# Patient Record
Sex: FEMALE | Race: WHITE | NOT HISPANIC OR LATINO | Employment: OTHER | URBAN - METROPOLITAN AREA
[De-identification: names, ages, dates, MRNs, and addresses within clinical notes are randomized per-mention and may not be internally consistent; named-entity substitution may affect disease eponyms.]

---

## 2021-03-03 DIAGNOSIS — Z23 NEED FOR VACCINATION: ICD-10-CM

## 2023-01-11 ENCOUNTER — TELEPHONE (OUTPATIENT)
Dept: HEALTH INFORMATION MANAGEMENT | Facility: OTHER | Age: 68
End: 2023-01-11
Payer: MEDICARE

## 2023-03-02 ENCOUNTER — HOSPITAL ENCOUNTER (OUTPATIENT)
Dept: LAB | Facility: MEDICAL CENTER | Age: 68
End: 2023-03-02
Attending: FAMILY MEDICINE
Payer: MEDICARE

## 2023-03-02 LAB
T4 FREE SERPL-MCNC: 1.33 NG/DL (ref 0.93–1.7)
TSH SERPL DL<=0.005 MIU/L-ACNC: 0.34 UIU/ML (ref 0.38–5.33)

## 2023-03-02 PROCEDURE — 36415 COLL VENOUS BLD VENIPUNCTURE: CPT

## 2023-03-02 PROCEDURE — 84443 ASSAY THYROID STIM HORMONE: CPT

## 2023-03-02 PROCEDURE — 84439 ASSAY OF FREE THYROXINE: CPT

## 2023-03-06 ENCOUNTER — HOSPITAL ENCOUNTER (OUTPATIENT)
Dept: LAB | Facility: MEDICAL CENTER | Age: 68
End: 2023-03-06
Attending: FAMILY MEDICINE
Payer: MEDICARE

## 2023-03-06 LAB
ALBUMIN SERPL BCP-MCNC: 4.5 G/DL (ref 3.2–4.9)
ALBUMIN/GLOB SERPL: 1.5 G/DL
ALP SERPL-CCNC: 86 U/L (ref 30–99)
ALT SERPL-CCNC: 20 U/L (ref 2–50)
ANION GAP SERPL CALC-SCNC: 12 MMOL/L (ref 7–16)
AST SERPL-CCNC: 26 U/L (ref 12–45)
BILIRUB SERPL-MCNC: 0.6 MG/DL (ref 0.1–1.5)
BUN SERPL-MCNC: 21 MG/DL (ref 8–22)
CALCIUM ALBUM COR SERPL-MCNC: 9.5 MG/DL (ref 8.5–10.5)
CALCIUM SERPL-MCNC: 9.9 MG/DL (ref 8.5–10.5)
CHLORIDE SERPL-SCNC: 102 MMOL/L (ref 96–112)
CHOLEST SERPL-MCNC: 264 MG/DL (ref 100–199)
CO2 SERPL-SCNC: 24 MMOL/L (ref 20–33)
CREAT SERPL-MCNC: 1.09 MG/DL (ref 0.5–1.4)
ERYTHROCYTE [DISTWIDTH] IN BLOOD BY AUTOMATED COUNT: 46.2 FL (ref 35.9–50)
FASTING STATUS PATIENT QL REPORTED: NORMAL
GFR SERPLBLD CREATININE-BSD FMLA CKD-EPI: 55 ML/MIN/1.73 M 2
GLOBULIN SER CALC-MCNC: 3 G/DL (ref 1.9–3.5)
GLUCOSE SERPL-MCNC: 91 MG/DL (ref 65–99)
HCT VFR BLD AUTO: 46.7 % (ref 37–47)
HDLC SERPL-MCNC: 64 MG/DL
HGB BLD-MCNC: 15.3 G/DL (ref 12–16)
LDLC SERPL CALC-MCNC: 174 MG/DL
MAGNESIUM SERPL-MCNC: 2 MG/DL (ref 1.5–2.5)
MCH RBC QN AUTO: 32.1 PG (ref 27–33)
MCHC RBC AUTO-ENTMCNC: 32.8 G/DL (ref 33.6–35)
MCV RBC AUTO: 98.1 FL (ref 81.4–97.8)
PLATELET # BLD AUTO: 252 K/UL (ref 164–446)
PMV BLD AUTO: 9.5 FL (ref 9–12.9)
POTASSIUM SERPL-SCNC: 5.3 MMOL/L (ref 3.6–5.5)
PROT SERPL-MCNC: 7.5 G/DL (ref 6–8.2)
RBC # BLD AUTO: 4.76 M/UL (ref 4.2–5.4)
SODIUM SERPL-SCNC: 138 MMOL/L (ref 135–145)
TRIGL SERPL-MCNC: 131 MG/DL (ref 0–149)
WBC # BLD AUTO: 6.1 K/UL (ref 4.8–10.8)

## 2023-03-06 PROCEDURE — 36415 COLL VENOUS BLD VENIPUNCTURE: CPT

## 2023-03-06 PROCEDURE — 80061 LIPID PANEL: CPT

## 2023-03-06 PROCEDURE — 83735 ASSAY OF MAGNESIUM: CPT

## 2023-03-06 PROCEDURE — 80053 COMPREHEN METABOLIC PANEL: CPT

## 2023-03-06 PROCEDURE — 85027 COMPLETE CBC AUTOMATED: CPT

## 2023-04-05 ENCOUNTER — HOSPITAL ENCOUNTER (OUTPATIENT)
Dept: LAB | Facility: MEDICAL CENTER | Age: 68
End: 2023-04-05
Attending: ORTHOPAEDIC SURGERY
Payer: MEDICARE

## 2023-04-05 LAB
BASOPHILS # BLD AUTO: 0.5 % (ref 0–1.8)
BASOPHILS # BLD: 0.04 K/UL (ref 0–0.12)
EOSINOPHIL # BLD AUTO: 0.06 K/UL (ref 0–0.51)
EOSINOPHIL NFR BLD: 0.8 % (ref 0–6.9)
ERYTHROCYTE [DISTWIDTH] IN BLOOD BY AUTOMATED COUNT: 44.8 FL (ref 35.9–50)
ERYTHROCYTE [SEDIMENTATION RATE] IN BLOOD BY WESTERGREN METHOD: 4 MM/HOUR (ref 0–25)
HCT VFR BLD AUTO: 49.4 % (ref 37–47)
HGB BLD-MCNC: 15.6 G/DL (ref 12–16)
IMM GRANULOCYTES # BLD AUTO: 0.02 K/UL (ref 0–0.11)
IMM GRANULOCYTES NFR BLD AUTO: 0.3 % (ref 0–0.9)
LYMPHOCYTES # BLD AUTO: 1.81 K/UL (ref 1–4.8)
LYMPHOCYTES NFR BLD: 23.3 % (ref 22–41)
MCH RBC QN AUTO: 31.2 PG (ref 27–33)
MCHC RBC AUTO-ENTMCNC: 31.6 G/DL (ref 33.6–35)
MCV RBC AUTO: 98.8 FL (ref 81.4–97.8)
MONOCYTES # BLD AUTO: 0.51 K/UL (ref 0–0.85)
MONOCYTES NFR BLD AUTO: 6.6 % (ref 0–13.4)
NEUTROPHILS # BLD AUTO: 5.32 K/UL (ref 2–7.15)
NEUTROPHILS NFR BLD: 68.5 % (ref 44–72)
NRBC # BLD AUTO: 0 K/UL
NRBC BLD-RTO: 0 /100 WBC
PLATELET # BLD AUTO: 286 K/UL (ref 164–446)
PMV BLD AUTO: 9.8 FL (ref 9–12.9)
RBC # BLD AUTO: 5 M/UL (ref 4.2–5.4)
WBC # BLD AUTO: 7.8 K/UL (ref 4.8–10.8)

## 2023-04-05 PROCEDURE — 86038 ANTINUCLEAR ANTIBODIES: CPT

## 2023-04-05 PROCEDURE — 86200 CCP ANTIBODY: CPT

## 2023-04-05 PROCEDURE — 85025 COMPLETE CBC W/AUTO DIFF WBC: CPT

## 2023-04-05 PROCEDURE — 85652 RBC SED RATE AUTOMATED: CPT

## 2023-04-05 PROCEDURE — 86431 RHEUMATOID FACTOR QUANT: CPT

## 2023-04-05 PROCEDURE — 36415 COLL VENOUS BLD VENIPUNCTURE: CPT

## 2023-04-05 PROCEDURE — 86812 HLA TYPING A B OR C: CPT

## 2023-04-05 PROCEDURE — 86140 C-REACTIVE PROTEIN: CPT

## 2023-04-05 PROCEDURE — 84550 ASSAY OF BLOOD/URIC ACID: CPT

## 2023-04-06 LAB
CRP SERPL HS-MCNC: <0.3 MG/DL (ref 0–0.75)
RHEUMATOID FACT SER IA-ACNC: 10 IU/ML (ref 0–14)
URATE SERPL-MCNC: 9.6 MG/DL (ref 1.9–8.2)

## 2023-04-07 LAB — HLA-B27 QL FC: NEGATIVE

## 2023-04-08 LAB
CCP IGG SERPL-ACNC: 7 UNITS (ref 0–19)
NUCLEAR IGG SER QL IA: NORMAL

## 2023-04-25 ENCOUNTER — OFFICE VISIT (OUTPATIENT)
Dept: CARDIOLOGY | Facility: MEDICAL CENTER | Age: 68
End: 2023-04-25
Payer: MEDICARE

## 2023-04-25 VITALS
BODY MASS INDEX: 29.03 KG/M2 | HEART RATE: 94 BPM | OXYGEN SATURATION: 98 % | SYSTOLIC BLOOD PRESSURE: 120 MMHG | DIASTOLIC BLOOD PRESSURE: 80 MMHG | HEIGHT: 67 IN | WEIGHT: 185 LBS | RESPIRATION RATE: 16 BRPM

## 2023-04-25 DIAGNOSIS — D68.69 HYPERCOAGULABLE STATE DUE TO PERSISTENT ATRIAL FIBRILLATION (HCC): ICD-10-CM

## 2023-04-25 DIAGNOSIS — I10 PRIMARY HYPERTENSION: ICD-10-CM

## 2023-04-25 DIAGNOSIS — I48.19 HYPERCOAGULABLE STATE DUE TO PERSISTENT ATRIAL FIBRILLATION (HCC): ICD-10-CM

## 2023-04-25 DIAGNOSIS — I48.91 ATRIAL FIBRILLATION, UNSPECIFIED TYPE (HCC): ICD-10-CM

## 2023-04-25 LAB — EKG IMPRESSION: NORMAL

## 2023-04-25 PROCEDURE — 99204 OFFICE O/P NEW MOD 45 MIN: CPT | Performed by: INTERNAL MEDICINE

## 2023-04-25 PROCEDURE — 93000 ELECTROCARDIOGRAM COMPLETE: CPT | Performed by: INTERNAL MEDICINE

## 2023-04-25 RX ORDER — LISINOPRIL AND HYDROCHLOROTHIAZIDE 20; 12.5 MG/1; MG/1
1 TABLET ORAL DAILY
COMMUNITY
Start: 2023-03-09

## 2023-04-25 RX ORDER — BISOPROLOL FUMARATE 5 MG/1
5 TABLET, FILM COATED ORAL DAILY
COMMUNITY
Start: 2023-04-04 | End: 2023-04-25

## 2023-04-25 RX ORDER — APIXABAN 5 MG/1
5 TABLET, FILM COATED ORAL 2 TIMES DAILY
COMMUNITY
Start: 2023-04-05

## 2023-04-25 RX ORDER — PRAMIPEXOLE DIHYDROCHLORIDE 0.12 MG/1
0.25 TABLET ORAL NIGHTLY PRN
COMMUNITY
Start: 2023-03-31

## 2023-04-25 RX ORDER — BISOPROLOL FUMARATE 10 MG/1
10 TABLET, FILM COATED ORAL DAILY
Qty: 100 TABLET | Refills: 4
Start: 2023-04-25 | End: 2023-05-04 | Stop reason: SDUPTHER

## 2023-04-25 ASSESSMENT — ENCOUNTER SYMPTOMS
HEMATOCHEZIA: 0
COUGH: 0
WHEEZING: 0
DIAPHORESIS: 0
FEVER: 0
HEMOPTYSIS: 0

## 2023-04-25 ASSESSMENT — FIBROSIS 4 INDEX: FIB4 SCORE: 1.42

## 2023-04-25 NOTE — Clinical Note
Nathaniel Duke, Can you schedule the cardioversion at least 3 weeks from today so that we will have had 3 weeks of missed doses of Eliquis.  Thank you CH

## 2023-04-25 NOTE — PROGRESS NOTES
Cardiology Initial Consultation Note    Date of note: 4/25/2023    Primary Care Provider: Sindy Del Rosario D.O.  Referring Provider: Sindy Del Rosario D.O.    Patient Name: Ela Meza  YOB: 1955  MRN:              [id number]    Chief Complaint   Patient presents with    Atrial Fibrillation     History of Present Illness: Ms. Ela Meza is a 68 y.o. female whose current medical problems include hypertension, excessive alcohol use, insomnia who is here for cardiac consultation for newly noted atrial fibrillation.    She recently went to see her primary care provider for multiple complaints.  Difficulty sleeping, drinking more, foot pain.  During this visit, atrial fibrillation was incidentally diagnosed.  Patient reports she has no symptoms from this.  On the visit though she did mention she is feeling more fatigued.  She normally does a lot of walking as she walks her dogs.  She has not really noticed any significant symptoms from that.  During this visit, she was started on bisoprolol 5 mg p.o. daily and Eliquis 5 mg p.o. twice daily.  She admits she has missed a few doses.  She also has cut back on her alcohol and is only having 1 drink and she measures it out every day.  She drinks 1 cup of coffee a day.     Blood tests drawn 3/6/23:  WBC 6.1, hemoglobin 15.3, platelets 252,000, sodium 138, potassium 5.3, glucose 91, creatinine 1.05, normal LFTs    Cardiovascular Risk Factors:  1. Smoking status:   Tobacco Use: Low Risk     Smoking Tobacco Use: Never    Smokeless Tobacco Use: Never    Passive Exposure: Not on file     2. Type II Diabetes Mellitus:   Lab Results   Component Value Date/Time    HBA1C 5.3 02/10/2020 10:52 AM     3. Hypertension: Yes on lisinopril-HCTZ 20-12.5 mg po qd  4. Dyslipidemia: No   Cholesterol,Tot   Date Value Ref Range Status   03/06/2023 264 (H) 100 - 199 mg/dL Final     LDL   Date Value Ref Range Status   03/06/2023 174 (H) <100 mg/dL Final     HDL   Date  "Value Ref Range Status   03/06/2023 64 >=40 mg/dL Final     Triglycerides   Date Value Ref Range Status   03/06/2023 131 0 - 149 mg/dL Final     5. Family history of early Coronary Artery Disease in a first degree relative (Male less than 55 years of age; Female less than 65 years of age): no    History reviewed. No pertinent past medical history.    History reviewed. No pertinent surgical history.    Current Outpatient Medications   Medication Sig Dispense Refill    pramipexole (MIRAPEX) 0.125 MG Tab Take 0.25 mg by mouth every evening.      lisinopril-hydrochlorothiazide (PRINZIDE) 20-12.5 MG per tablet Take 1 Tablet by mouth every day.      ELIQUIS 5 MG Tab Take 5 mg by mouth 2 times a day.      bisoprolol (ZEBETA) 10 MG tablet Take 1 Tablet by mouth every day. 100 Tablet 4     No current facility-administered medications for this visit.       Allergies   Allergen Reactions    Compazine        History reviewed. No pertinent family history.    Social History     Socioeconomic History    Marital status:    Tobacco Use    Smoking status: Never    Smokeless tobacco: Never   Substance and Sexual Activity    Alcohol use: Yes     Alcohol/week: 4.2 oz     Types: 7 Standard drinks or equivalent per week    Drug use: Never        Review of Systems   Constitutional: Negative for diaphoresis and fever.   Respiratory:  Negative for cough, hemoptysis and wheezing.    Gastrointestinal:  Negative for hematochezia and melena.   Genitourinary:  Negative for hematuria.     Physical Exam:  Ambulatory Vitals  /80   Pulse 94   Resp 16   Ht 1.702 m (5' 7\")   Wt 83.9 kg (185 lb)   SpO2 98%    Oxygen Therapy:  Pulse Oximetry: 98 %  BP Readings from Last 4 Encounters:   04/25/23 120/80   03/01/12 138/88       Weight/BMI:   Body mass index is 28.98 kg/m².  Wt Readings from Last 2 Encounters:   04/25/23 83.9 kg (185 lb)   03/01/12 91.6 kg (202 lb)     Physical Exam  Constitutional:       Appearance: Normal appearance. "   Eyes:      Extraocular Movements: Extraocular movements intact.      Conjunctiva/sclera: Conjunctivae normal.   Neck:      Vascular: No carotid bruit or JVD.   Cardiovascular:      Rate and Rhythm: Normal rate. Rhythm irregular.      Pulses:           Radial pulses are 2+ on the right side and 2+ on the left side.        Posterior tibial pulses are 1+ on the right side and 1+ on the left side.      Heart sounds: Normal heart sounds. No murmur heard.    No friction rub. No gallop.   Pulmonary:      Effort: Pulmonary effort is normal. No respiratory distress.      Breath sounds: Normal breath sounds. No wheezing.   Abdominal:      General: Bowel sounds are normal.      Palpations: Abdomen is soft.   Musculoskeletal:      Cervical back: Neck supple.      Right lower leg: No edema.      Left lower leg: No edema.   Skin:     General: Skin is warm and dry.   Neurological:      Mental Status: She is alert and oriented to person, place, and time. Mental status is at baseline.   Psychiatric:         Mood and Affect: Mood normal.        Lab Data Review:  Lab Results   Component Value Date/Time    SODIUM 138 03/06/2023 01:07 PM    POTASSIUM 5.3 03/06/2023 01:07 PM    CHLORIDE 102 03/06/2023 01:07 PM    CO2 24 03/06/2023 01:07 PM    GLUCOSE 91 03/06/2023 01:07 PM    BUN 21 03/06/2023 01:07 PM    CREATININE 1.09 03/06/2023 01:07 PM     Lab Results   Component Value Date/Time    ALKPHOSPHAT 86 03/06/2023 01:07 PM    ASTSGOT 26 03/06/2023 01:07 PM    ALTSGPT 20 03/06/2023 01:07 PM    TBILIRUBIN 0.6 03/06/2023 01:07 PM      Lab Results   Component Value Date/Time    WBC 7.8 04/05/2023 03:44 PM     Lab Results   Component Value Date/Time    TSHULTRASEN 0.340 (L) 03/02/2023 12:06 PM   Free T4 1.33    Cardiac Imaging and Procedures Review:    EKG dated 3/6/23: My personal interpretation is atrial fibrillation, 114 bpm, poor R wave progression, consider lead placement.  ECG again repeated today 4/25/23: Atrial fibrillation, 102 bpm,  low voltages in the precordial leads.  Abnormal R wave progression likely lead placement    Assessment & Plan     1.  Atrial fibrillation of unknown duration.  Not quite clear if she is truly symptomatic or not.  She does recall having an ECG done 3 years ago and was not told she had atrial fibrillation.  Therefore presumptively I would assume A-fib occurred between then and now also about 3 years.  Discussed with her treatments of atrial fibrillation which includes rate control versus rhythm control.  As this is first time this has been captured, discussed it would not be unreasonable to attempt cardioversion to achieve sinus rhythm.  Went over risks of electrical cardioversion which includes respiratory depression from deep sedation, operation pneumonia, skin burns, arrhythmias, less than 1% risk of stroke if she does not miss any doses of the Eliquis, may not be successful.  She would like to proceed.  - Schedule cardioversion, but at least 3 weeks from today so that she will not miss any doses of the Eliquis  - Increase bisoprolol to 10 mg p.o. daily for better rate control  -Check an echo    [id number] Score of 3.  Risk of stroke is 3 to 4 %/year.  Based on current guidelines, would recommend direct thrombin inhibitor.  Patient is agreeable to taking Eliquis 5 mg p.o. twice daily (discussed at least 10 hours apart but no more than 12 hours apart).  Patient did ask about the watchman.  I discussed with her that it is available, we typically reserved for patients who cannot tolerate Eliquis.  However if she is interested, we can refer her to structural heart program.  She would like to hold off and ask again in 1 year.  - Continue Eliquis 5 mg p.o. twice daily    3.  Hypertension.  Controlled.  - Continue with lisinopril hydrochlorothiazide.  Patient is also on bisoprolol for rate control of atrial fibrillation.  This can also lower blood pressure.    Shared Medical Decision Making:  All of patient's  questions were answered to the best of my knowledge and to patient's satisfaction. It was a pleasure seeing Ms. Ela Meza in my clinic today. Return in about 2 months (around 6/25/2023). Patient is aware to call the cardiology clinic with any questions or concerns.    Marisol Simeon MD  [facility]  [address]  [place] [address]  Phone:  [phone]  Fax:  [phone]    Please note that this dictation was created using voice recognition software. I have made every reasonable attempt to correct obvious errors, but it is possible there are errors of grammar and possibly content that I did not discover before finalizing the note.

## 2023-04-26 ENCOUNTER — TELEPHONE (OUTPATIENT)
Dept: CARDIOLOGY | Facility: MEDICAL CENTER | Age: 68
End: 2023-04-26
Payer: MEDICARE

## 2023-04-26 NOTE — TELEPHONE ENCOUNTER
Patient is scheduled on 5-18-23 for a Cardioversion w/anesthesia with . No meds to stop and patient to check in at 8:00 for a 10:00 procedure. H&P was done on 4-25-23 by Dr. NERISSA Simeon. Pre admit to call patient.

## 2023-04-26 NOTE — TELEPHONE ENCOUNTER
----- Message from Marisol Simeon M.D. sent at 4/25/2023  4:35 PM PDT -----  Nathaniel Duke,  Can you schedule the cardioversion at least 3 weeks from today so that we will have had 3 weeks of missed doses of Eliquis.  Thank you CH

## 2023-04-27 ENCOUNTER — APPOINTMENT (OUTPATIENT)
Dept: ADMISSIONS | Facility: MEDICAL CENTER | Age: 68
End: 2023-04-27
Attending: INTERNAL MEDICINE
Payer: MEDICARE

## 2023-05-01 ENCOUNTER — TELEPHONE (OUTPATIENT)
Dept: CARDIOLOGY | Facility: MEDICAL CENTER | Age: 68
End: 2023-05-01
Payer: MEDICARE

## 2023-05-01 DIAGNOSIS — I48.91 ATRIAL FIBRILLATION, UNSPECIFIED TYPE (HCC): ICD-10-CM

## 2023-05-01 DIAGNOSIS — I10 PRIMARY HYPERTENSION: ICD-10-CM

## 2023-05-01 NOTE — TELEPHONE ENCOUNTER
Caller:  Delmar Mahmood    Medication Name and Dosage:    bisoprolol (ZEBETA) 10 MG tablet (Order #[id number]) on 4/25/23    Medication amount left: New    Preferred Pharmacy:   [facility] [address]    Other questions (Topic):  Ready to fill    Callback Number (Will only call for issues): [phone]    Thank you,   Susana MATTHEWS

## 2023-05-04 RX ORDER — BISOPROLOL FUMARATE 10 MG/1
10 TABLET, FILM COATED ORAL DAILY
Qty: 100 TABLET | Refills: 4 | Status: SHIPPED | OUTPATIENT
Start: 2023-05-04

## 2023-05-04 NOTE — TELEPHONE ENCOUNTER
"Reviewed chart, see that bisoprolol Rx was sent by  at last visit 4/25/23 to pt's [facility] pharmacy. Noted on Rx--\"don't fill yet\". I tried to call pt to let her know that it was sent and she can contact pharmacy to fill, no answer, left  msg informing her and I will go ahead and send again to ensure no issue on our end. Asked pt to call back if she has any issues.  "

## 2023-05-04 NOTE — TELEPHONE ENCOUNTER
Caller: Ela Meza     Topic/issue: Pt is extremely upset she does not have any of the bisoprolol (ZEBETA) 5 MG Tab [[id number]]      Pt states  this was requested on 05/01/2023 and has a long drive to the [facility] on [address]. She is stating this is unprofessional. Her dosage was changed and should have been called in on 04/25/2023. Please call once it was sent in .    Call back: [phone] (home)      Thank You    Edwina MCGARRY

## 2023-05-05 ENCOUNTER — PRE-ADMISSION TESTING (OUTPATIENT)
Dept: ADMISSIONS | Facility: MEDICAL CENTER | Age: 68
End: 2023-05-05
Attending: INTERNAL MEDICINE
Payer: MEDICARE

## 2023-05-05 VITALS — HEIGHT: 67 IN | WEIGHT: 185 LBS | BODY MASS INDEX: 29.03 KG/M2

## 2023-05-05 ASSESSMENT — FIBROSIS 4 INDEX: FIB4 SCORE: 1.42

## 2023-05-15 ENCOUNTER — PRE-ADMISSION TESTING (OUTPATIENT)
Dept: ADMISSIONS | Facility: MEDICAL CENTER | Age: 68
End: 2023-05-15
Attending: INTERNAL MEDICINE
Payer: MEDICARE

## 2023-05-15 DIAGNOSIS — Z01.812 PRE-OPERATIVE LABORATORY EXAMINATION: ICD-10-CM

## 2023-05-15 LAB
ANION GAP SERPL CALC-SCNC: 13 MMOL/L (ref 7–16)
BUN SERPL-MCNC: 28 MG/DL (ref 8–22)
CALCIUM SERPL-MCNC: 9 MG/DL (ref 8.5–10.5)
CHLORIDE SERPL-SCNC: 98 MMOL/L (ref 96–112)
CO2 SERPL-SCNC: 22 MMOL/L (ref 20–33)
CREAT SERPL-MCNC: 1.39 MG/DL (ref 0.5–1.4)
ERYTHROCYTE [DISTWIDTH] IN BLOOD BY AUTOMATED COUNT: 47.9 FL (ref 35.9–50)
GFR SERPLBLD CREATININE-BSD FMLA CKD-EPI: 41 ML/MIN/1.73 M 2
GLUCOSE SERPL-MCNC: 93 MG/DL (ref 65–99)
HCT VFR BLD AUTO: 43.8 % (ref 37–47)
HGB BLD-MCNC: 14.3 G/DL (ref 12–16)
INR PPP: 1.13 (ref 0.87–1.13)
MCH RBC QN AUTO: 31.4 PG (ref 27–33)
MCHC RBC AUTO-ENTMCNC: 32.6 G/DL (ref 33.6–35)
MCV RBC AUTO: 96.1 FL (ref 81.4–97.8)
PLATELET # BLD AUTO: 273 K/UL (ref 164–446)
PMV BLD AUTO: 9.2 FL (ref 9–12.9)
POTASSIUM SERPL-SCNC: 5 MMOL/L (ref 3.6–5.5)
PROTHROMBIN TIME: 14.3 SEC (ref 12–14.6)
RBC # BLD AUTO: 4.56 M/UL (ref 4.2–5.4)
SODIUM SERPL-SCNC: 133 MMOL/L (ref 135–145)
WBC # BLD AUTO: 4.8 K/UL (ref 4.8–10.8)

## 2023-05-15 PROCEDURE — 36415 COLL VENOUS BLD VENIPUNCTURE: CPT

## 2023-05-15 PROCEDURE — 80048 BASIC METABOLIC PNL TOTAL CA: CPT

## 2023-05-15 PROCEDURE — 85027 COMPLETE CBC AUTOMATED: CPT

## 2023-05-15 PROCEDURE — 85610 PROTHROMBIN TIME: CPT

## 2023-05-18 ENCOUNTER — HOSPITAL ENCOUNTER (OUTPATIENT)
Facility: MEDICAL CENTER | Age: 68
End: 2023-05-18
Attending: INTERNAL MEDICINE | Admitting: INTERNAL MEDICINE
Payer: MEDICARE

## 2023-05-18 ENCOUNTER — APPOINTMENT (OUTPATIENT)
Dept: CARDIOLOGY | Facility: MEDICAL CENTER | Age: 68
End: 2023-05-18
Attending: INTERNAL MEDICINE
Payer: MEDICARE

## 2023-05-18 ENCOUNTER — ANESTHESIA (OUTPATIENT)
Dept: CARDIOLOGY | Facility: MEDICAL CENTER | Age: 68
End: 2023-05-18
Payer: MEDICARE

## 2023-05-18 ENCOUNTER — ANESTHESIA EVENT (OUTPATIENT)
Dept: CARDIOLOGY | Facility: MEDICAL CENTER | Age: 68
End: 2023-05-18
Payer: MEDICARE

## 2023-05-18 VITALS
DIASTOLIC BLOOD PRESSURE: 69 MMHG | BODY MASS INDEX: 29.62 KG/M2 | RESPIRATION RATE: 16 BRPM | HEART RATE: 64 BPM | WEIGHT: 188.71 LBS | SYSTOLIC BLOOD PRESSURE: 129 MMHG | OXYGEN SATURATION: 98 % | HEIGHT: 67 IN | TEMPERATURE: 96.8 F

## 2023-05-18 DIAGNOSIS — I48.91 ATRIAL FIBRILLATION, UNSPECIFIED TYPE (HCC): ICD-10-CM

## 2023-05-18 LAB
EKG IMPRESSION: NORMAL
EKG IMPRESSION: NORMAL

## 2023-05-18 PROCEDURE — 700101 HCHG RX REV CODE 250: Performed by: ANESTHESIOLOGY

## 2023-05-18 PROCEDURE — 93010 ELECTROCARDIOGRAM REPORT: CPT | Performed by: INTERNAL MEDICINE

## 2023-05-18 PROCEDURE — 160002 HCHG RECOVERY MINUTES (STAT)

## 2023-05-18 PROCEDURE — 99100 ANES PT EXTEME AGE<1 YR&>70: CPT | Performed by: ANESTHESIOLOGY

## 2023-05-18 PROCEDURE — 700105 HCHG RX REV CODE 258: Performed by: INTERNAL MEDICINE

## 2023-05-18 PROCEDURE — 160035 HCHG PACU - 1ST 60 MINS PHASE I

## 2023-05-18 PROCEDURE — 700111 HCHG RX REV CODE 636 W/ 250 OVERRIDE (IP): Performed by: ANESTHESIOLOGY

## 2023-05-18 PROCEDURE — 00410 ANES INTEG SYS CONV ARRHYT: CPT | Performed by: ANESTHESIOLOGY

## 2023-05-18 PROCEDURE — 93005 ELECTROCARDIOGRAM TRACING: CPT | Performed by: INTERNAL MEDICINE

## 2023-05-18 PROCEDURE — 92960 CARDIOVERSION ELECTRIC EXT: CPT | Mod: 59 | Performed by: INTERNAL MEDICINE

## 2023-05-18 PROCEDURE — 92960 CARDIOVERSION ELECTRIC EXT: CPT

## 2023-05-18 RX ORDER — SODIUM CHLORIDE, SODIUM LACTATE, POTASSIUM CHLORIDE, CALCIUM CHLORIDE 600; 310; 30; 20 MG/100ML; MG/100ML; MG/100ML; MG/100ML
INJECTION, SOLUTION INTRAVENOUS CONTINUOUS
Status: DISCONTINUED | OUTPATIENT
Start: 2023-05-18 | End: 2023-05-18 | Stop reason: HOSPADM

## 2023-05-18 RX ORDER — ACETAMINOPHEN 500 MG
500-1000 TABLET ORAL EVERY 6 HOURS PRN
COMMUNITY

## 2023-05-18 RX ORDER — ALLOPURINOL 100 MG/1
100 TABLET ORAL DAILY
COMMUNITY
Start: 2023-05-12

## 2023-05-18 RX ORDER — HYDRALAZINE HYDROCHLORIDE 20 MG/ML
5 INJECTION INTRAMUSCULAR; INTRAVENOUS
Status: DISCONTINUED | OUTPATIENT
Start: 2023-05-18 | End: 2023-05-18 | Stop reason: HOSPADM

## 2023-05-18 RX ORDER — LIDOCAINE HYDROCHLORIDE 20 MG/ML
INJECTION, SOLUTION EPIDURAL; INFILTRATION; INTRACAUDAL; PERINEURAL PRN
Status: DISCONTINUED | OUTPATIENT
Start: 2023-05-18 | End: 2023-05-18 | Stop reason: SURG

## 2023-05-18 RX ORDER — ONDANSETRON 2 MG/ML
4 INJECTION INTRAMUSCULAR; INTRAVENOUS
Status: DISCONTINUED | OUTPATIENT
Start: 2023-05-18 | End: 2023-05-18 | Stop reason: HOSPADM

## 2023-05-18 RX ORDER — EPHEDRINE SULFATE 50 MG/ML
5 INJECTION, SOLUTION INTRAVENOUS
Status: DISCONTINUED | OUTPATIENT
Start: 2023-05-18 | End: 2023-05-18 | Stop reason: HOSPADM

## 2023-05-18 RX ORDER — HALOPERIDOL 5 MG/ML
1 INJECTION INTRAMUSCULAR
Status: DISCONTINUED | OUTPATIENT
Start: 2023-05-18 | End: 2023-05-18 | Stop reason: HOSPADM

## 2023-05-18 RX ORDER — DIPHENHYDRAMINE HYDROCHLORIDE 50 MG/ML
12.5 INJECTION INTRAMUSCULAR; INTRAVENOUS
Status: DISCONTINUED | OUTPATIENT
Start: 2023-05-18 | End: 2023-05-18 | Stop reason: HOSPADM

## 2023-05-18 RX ADMIN — PROPOFOL 20 MG: 10 INJECTION, EMULSION INTRAVENOUS at 10:05

## 2023-05-18 RX ADMIN — SODIUM CHLORIDE, POTASSIUM CHLORIDE, SODIUM LACTATE AND CALCIUM CHLORIDE: 600; 310; 30; 20 INJECTION, SOLUTION INTRAVENOUS at 09:58

## 2023-05-18 RX ADMIN — PROPOFOL 50 MG: 10 INJECTION, EMULSION INTRAVENOUS at 10:04

## 2023-05-18 RX ADMIN — LIDOCAINE HYDROCHLORIDE 100 MG: 20 INJECTION, SOLUTION EPIDURAL; INFILTRATION; INTRACAUDAL at 10:04

## 2023-05-18 ASSESSMENT — FIBROSIS 4 INDEX: FIB4 SCORE: 1.490711984999859798

## 2023-05-18 ASSESSMENT — PAIN SCALES - GENERAL: PAIN_LEVEL: 0

## 2023-05-18 NOTE — ANESTHESIA TIME REPORT
Anesthesia Start and Stop Event Times     Date Time Event    5/18/2023 0955 Ready for Procedure     0958 Anesthesia Start     1013 Anesthesia Stop        Responsible Staff  05/18/23    Name Role Begin End    Gil Kennedy M.D. Anesth 0958 1013        Overtime Reason:  no overtime (within assigned shift)    Comments:

## 2023-05-18 NOTE — ANESTHESIA POSTPROCEDURE EVALUATION
Patient: Ela Meza    Procedure Summary     Date: 05/18/23 Room / Location: [facility] ECHOCARDIOLOGY [facility]    Anesthesia Start: 0958 Anesthesia Stop: 1013    Procedure: CL-CARDIOVERSION Diagnosis:       Atrial fibrillation, unspecified type (HCC)      Unspecified atrial fibrillation    Scheduled Providers: Sebastian Diaz M.D.; Gil Kennedy M.D. Responsible Provider: Gil Kennedy M.D.    Anesthesia Type: MAC ASA Status: 2          Final Anesthesia Type: MAC  Last vitals  BP   Blood Pressure : 129/69    Temp   36 °C (96.8 °F)    Pulse   64   Resp   16    SpO2   98 %      Anesthesia Post Evaluation    Patient location during evaluation: PACU  Patient participation: complete - patient participated  Level of consciousness: awake and alert  Pain score: 0    Airway patency: patent  Anesthetic complications: no  Cardiovascular status: hemodynamically stable  Respiratory status: acceptable  Hydration status: euvolemic    PONV: none          No notable events documented.     Nurse Pain Score: 0 (NPRS)

## 2023-05-18 NOTE — DISCHARGE INSTRUCTIONS
If any questions arise, call your provider.  If your provider is not available, please feel free to call the [facility] at [phone].    MEDICATIONS: Resume taking daily medication.  Take prescribed pain medication with food.  If no medication is prescribed, you may take non-aspirin pain medication if needed.  PAIN MEDICATION CAN BE VERY CONSTIPATING.  Take a stool softener or laxative such as senokot, pericolace, or milk of magnesia if needed.    What to Expect Post Anesthesia    Rest and take it easy for the first 24 hours.  A responsible adult is recommended to remain with you during that time.  It is normal to feel sleepy.  We encourage you to not do anything that requires balance, judgment or coordination.    FOR 24 HOURS DO NOT:  Drive, operate machinery or run household appliances.  Drink beer or alcoholic beverages.  Make important decisions or sign legal documents.    To avoid nausea, slowly advance diet as tolerated, avoiding spicy or greasy foods for the first day.  Add more substantial food to your diet according to your provider's instructions.  Babies can be fed formula or breast milk as soon as they are hungry.  INCREASE FLUIDS AND FIBER TO AVOID CONSTIPATION.    MILD FLU-LIKE SYMPTOMS ARE NORMAL.  YOU MAY EXPERIENCE GENERALIZED MUSCLE ACHES, THROAT IRRITATION, HEADACHE AND/OR SOME NAUSEA.

## 2023-05-18 NOTE — ANESTHESIA PREPROCEDURE EVALUATION
Date/Time: 05/18/23 1000    Scheduled providers: Sebastian Diaz M.D.; Gil Kennedy M.D.    Procedure: CL-CARDIOVERSION    Diagnosis:       Atrial fibrillation, unspecified type (HCC) [I48.91]      Unspecified atrial fibrillation [I48.91]    Location: [facility] IMAGING - ECHOCARDIOLOGY [facility]          Relevant Problems   No relevant active problems       Physical Exam    Airway   Mallampati: II  TM distance: >3 FB  Neck ROM: full       Cardiovascular - normal exam  Rhythm: regular  Rate: normal  (-) murmur     Dental - normal exam           Pulmonary - normal exam  Breath sounds clear to auscultation     Abdominal    Neurological - normal exam                 Anesthesia Plan    ASA 2       Plan - MAC               Induction: intravenous      Pertinent diagnostic labs and testing reviewed    Informed Consent:    Anesthetic plan and risks discussed with patient.

## 2023-05-18 NOTE — PROCEDURES
Cardioversion Note    DATE: 5/18/2023    : Sang Saucedo MD    PROCEDURE PERFORMED:  Direct current cardioversion    INDICATION:  Atrial fibrillation    CONSENT:  The complete alternatives, risks, and benefits of the procedure were explained to the patient. Signed informed consent was obtained and placed in the chart prior to the procedure.    MEDICATIONS:  See Anesthesia record    ESTIMATED BLOOD LOSS: N/A    COMPLICATIONS: None    DESCRIPTION OF PROCEDURE:  The patient was brought to the procedure area in the fasting state.  Defibrillation pads were placed in the anterior/posterior position.  After achieving adequate sedation, one 200 J synchronized shock was delivered with successful conversion from atrial fibrillation to normal sinus rhythm.    IMPRESSION:  Successful direct current cardioverson

## 2023-05-18 NOTE — OR NURSING
Pt A&OX4. VSS. Pt on room air. Afebrile. Pt in sinus rhythm per pacu monitor and EKG post procedure. Respirations evne and unlabored, no shortness of breath. No pain or nausea. IV d/c'd. D/C instructions reviewed with pt. Assisted to dress. Standby assist with pt out to 's car.

## 2023-06-25 NOTE — PROGRESS NOTES
Cardiology Follow Up Note    Date of note: 6/24/2023    Primary Care Provider: Sindy Del Rosario D.O.    Patient Name: Ela Meza  YOB: 1955  MRN:              [id number]    Reason for Followup: Post cardioversion, echo is scheduled for 8/18/2023    History of Present Illness: Ms. Ela Meza is a 68 y.o. female whose current medical problems include hypertension, excessive alcohol use, insomnia, and atrial fibrillation who is here for cardiac for follow up.  Patient reports she is doing well.  She did have a skin burn after the cardioversion but it has resolved.  She does not report any difference in symptoms before after cardioversion.  She has never really felt any palpitations, no decrease in exercise tolerance, no fatigue.  She reports she is able to do all her activities without any problems.    She is interested in the Watchman device more because of the price of Eliquis.  She is still thinking about it.  Gave her a pamphlet.  She does not have any contraindication for direct thrombin inhibitors so I am not sure she would be a candidate.  However she would like we can always refer to structural heart program to see if she is a candidate.    Patient was seen 4/25/2003 for management of newly diagnosed atrial fibrillation.  Cardioversion was scheduled.  Bisoprolol increased to 10 mg p.o. daily.  Echo was ordered.  Eliquis 5 mg p.o. daily was continued.    The patient underwent electrical cardioversion on 5/18/2023 and successfully converted back to sinus rhythm.  Review of ECG dated 5/18/2023: Sinus rhythm, first-degree AV block, low voltages in the precordial leads, compared to prior ECG, sinus rhythm is now present    Cardiovascular Risk Factors:  1. Smoking status:   Tobacco Use: Low Risk  (6/27/2023)    Patient History     Smoking Tobacco Use: Never     Smokeless Tobacco Use: Never     Passive Exposure: Not on file     2. Type II Diabetes Mellitus:   Lab Results   Component  Value Date/Time    HBA1C 5.3 02/10/2020 10:52 AM     3. Hypertension: Yes on lisinopril hydrochlorothiazide 20-12.5 mg p.o. daily, bisoprolol 10 mg p.o. daily  4. Dyslipidemia: Not on statins  Cholesterol,Tot   Date Value Ref Range Status   03/06/2023 264 (H) 100 - 199 mg/dL Final     LDL   Date Value Ref Range Status   03/06/2023 174 (H) <100 mg/dL Final     HDL   Date Value Ref Range Status   03/06/2023 64 >=40 mg/dL Final     Triglycerides   Date Value Ref Range Status   03/06/2023 131 0 - 149 mg/dL Final     No problems updated.     History reviewed. No pertinent surgical history.     Current Outpatient Medications   Medication Sig Dispense Refill    bisoprolol (ZEBETA) 5 MG Tab Take 1 Tablet by mouth every day. 90 Tablet 4    allopurinol (ZYLOPRIM) 100 MG Tab Take 100 mg by mouth every day.      acetaminophen (TYLENOL) 500 MG Tab Take 500-1,000 mg by mouth every 6 hours as needed. Indications: Pain      Prenatal Vit-Fe Fumarate-FA (PRENATAL VITAMIN PO) Take 1 Tablet by mouth every day.      bisoprolol (ZEBETA) 10 MG tablet Take 1 Tablet by mouth every day. 100 Tablet 4    pramipexole (MIRAPEX) 0.125 MG Tab Take 0.25 mg by mouth at bedtime as needed. Indications: Restless Leg Syndrome      lisinopril-hydrochlorothiazide (PRINZIDE) 20-12.5 MG per tablet Take 1 Tablet by mouth every day.      ELIQUIS 5 MG Tab Take 5 mg by mouth 2 times a day.      colchicine (COLCRYS) 0.6 MG Tab Take 0.6 mg by mouth every day.       No current facility-administered medications for this visit.       Allergies   Allergen Reactions    Nsaids Unspecified     S/P Bayron n Y    Compazine Swelling     tongue     Review of Systems   Constitutional: Negative for diaphoresis and fever.   Cardiovascular:  Negative for chest pain, dyspnea on exertion, leg swelling, near-syncope, orthopnea, palpitations, paroxysmal nocturnal dyspnea and syncope.   Respiratory: Negative.  Negative for cough, hemoptysis and wheezing.    Gastrointestinal:  Negative  "for hematochezia and melena.   Genitourinary:  Negative for hematuria.       Physical Exam:  Ambulatory Vitals  /80 (BP Location: Left arm, Patient Position: Sitting, BP Cuff Size: Adult)   Pulse 96   Resp 16   Ht 1.702 m (5' 7\")   Wt 86.2 kg (190 lb)   SpO2 100%    Oxygen Therapy:  Pulse Oximetry: 100 %  BP Readings from Last 4 Encounters:   06/27/23 118/80   05/18/23 129/69   04/25/23 120/80   03/01/12 138/88       Weight/BMI:   Body mass index is 29.76 kg/m².  Wt Readings from Last 2 Encounters:   06/27/23 86.2 kg (190 lb)   05/18/23 85.6 kg (188 lb 11.4 oz)       Physical Exam  Constitutional:       Appearance: Normal appearance.   Neck:      Vascular: No JVD.   Cardiovascular:      Rate and Rhythm: Normal rate. Rhythm irregularly irregular.      Pulses: Normal pulses and intact distal pulses.      Heart sounds: S1 normal and S2 normal. No murmur heard.     No friction rub. No gallop.   Pulmonary:      Effort: Pulmonary effort is normal. No respiratory distress.      Breath sounds: Normal breath sounds. No wheezing or rales.   Musculoskeletal:      Cervical back: Neck supple.   Neurological:      Mental Status: She is alert and oriented to person, place, and time.        Lab Data Review:  Lab Results   Component Value Date/Time    SODIUM 133 (L) 05/15/2023 11:33 AM    POTASSIUM 5.0 05/15/2023 11:33 AM    CHLORIDE 98 05/15/2023 11:33 AM    CO2 22 05/15/2023 11:33 AM    GLUCOSE 93 05/15/2023 11:33 AM    BUN 28 (H) 05/15/2023 11:33 AM    CREATININE 1.39 05/15/2023 11:33 AM     Lab Results   Component Value Date/Time    ALKPHOSPHAT 86 03/06/2023 01:07 PM    ASTSGOT 26 03/06/2023 01:07 PM    ALTSGPT 20 03/06/2023 01:07 PM    TBILIRUBIN 0.6 03/06/2023 01:07 PM      Lab Results   Component Value Date/Time    WBC 4.8 05/15/2023 11:33 AM     Lab Results   Component Value Date/Time    TSHULTRASEN 0.340 (L) 03/02/2023 12:06 PM         Cardiac Imaging and Procedures Review:    EKG dated 5/18/2023: My personal " interpretation is sinus rhythm, 66 bpm, first-degree AV block, low voltages in the precordial leads    ECG dated 6/27/2023: Atrial fibrillation, 79 bpm, nonspecific ST changes, low voltages in the precordial leads    Assessment & Plan     1.  Persistent atrial fibrillation.  Patient was successfully cardioverted back to sinus, but has reverted back to atrial fibrillation.  She really does not have a lot of symptoms.  I discussed with her rate control seems reasonable.  Her pulse Mitzy varies between 70s up to 90s.  I think she could likely be better rate controlled.  Discussed increasing bisoprolol to 15 mg p.o. daily.  We will have her take 10 in the morning and 5 in the evening.  Patient would be agreeable.  - Increase bisoprolol to 10 mg in the morning and add 5 mg in the evening  -Echo had been ordered last visit, but is currently scheduled for August 18, 2023    [id number] Score of 3.  Continue with Eliquis 5 mg p.o. twice daily  - Continue with Eliquis 5 mg p.o. twice daily  - Did give her information about watchman as she was interested    3.  Hypertension.  Controlled.  - Continue with lisinopril hydrochlorothiazide  - Discussed with her increasing bisoprolol can also lower blood pressure so she should watch her blood pressure and let us know if there is any problems.    Shared Medical Decision Making:  All of patient's questions were answered to the best of my knowledge and to patient's satisfaction. It was a pleasure seeing Ms. Ela Meza in my clinic today. Return in about 6 months (around 12/27/2023). Patient is aware to call the cardiology clinic with any questions or concerns.    Marisol Simeon MD  [facility]  [address]  [place] [address]  Phone:  [phone]  Fax:  [phone]    Please note that this dictation was created using voice recognition software. I have made every reasonable attempt to correct obvious errors, but it is possible  there are errors of grammar and possibly content that I did not discover before finalizing the note.

## 2023-06-27 ENCOUNTER — OFFICE VISIT (OUTPATIENT)
Dept: CARDIOLOGY | Facility: MEDICAL CENTER | Age: 68
End: 2023-06-27
Attending: INTERNAL MEDICINE
Payer: MEDICARE

## 2023-06-27 VITALS
HEART RATE: 96 BPM | HEIGHT: 67 IN | SYSTOLIC BLOOD PRESSURE: 118 MMHG | BODY MASS INDEX: 29.82 KG/M2 | RESPIRATION RATE: 16 BRPM | DIASTOLIC BLOOD PRESSURE: 80 MMHG | OXYGEN SATURATION: 100 % | WEIGHT: 190 LBS

## 2023-06-27 DIAGNOSIS — I10 PRIMARY HYPERTENSION: ICD-10-CM

## 2023-06-27 DIAGNOSIS — I48.19 PERSISTENT ATRIAL FIBRILLATION (HCC): ICD-10-CM

## 2023-06-27 PROCEDURE — 99214 OFFICE O/P EST MOD 30 MIN: CPT | Performed by: INTERNAL MEDICINE

## 2023-06-27 PROCEDURE — 3079F DIAST BP 80-89 MM HG: CPT | Performed by: INTERNAL MEDICINE

## 2023-06-27 PROCEDURE — 3074F SYST BP LT 130 MM HG: CPT | Performed by: INTERNAL MEDICINE

## 2023-06-27 PROCEDURE — 99212 OFFICE O/P EST SF 10 MIN: CPT | Performed by: INTERNAL MEDICINE

## 2023-06-27 RX ORDER — BISOPROLOL FUMARATE 5 MG/1
5 TABLET, FILM COATED ORAL DAILY
Qty: 90 TABLET | Refills: 4 | Status: SHIPPED | OUTPATIENT
Start: 2023-06-27

## 2023-06-27 RX ORDER — COLCHICINE 0.6 MG/1
0.6 TABLET ORAL DAILY
COMMUNITY
Start: 2023-06-10

## 2023-06-27 ASSESSMENT — ENCOUNTER SYMPTOMS
DIAPHORESIS: 0
NEAR-SYNCOPE: 0
FEVER: 0
HEMATOCHEZIA: 0
WHEEZING: 0
COUGH: 0
ORTHOPNEA: 0
RESPIRATORY NEGATIVE: 1
HEMOPTYSIS: 0
SYNCOPE: 0
PALPITATIONS: 0
DYSPNEA ON EXERTION: 0
PND: 0

## 2023-06-27 ASSESSMENT — FIBROSIS 4 INDEX: FIB4 SCORE: 1.490711984999859798

## 2023-10-27 ENCOUNTER — DOCUMENTATION (OUTPATIENT)
Dept: HEALTH INFORMATION MANAGEMENT | Facility: OTHER | Age: 68
End: 2023-10-27
Payer: MEDICARE

## 2023-12-20 ENCOUNTER — APPOINTMENT (OUTPATIENT)
Dept: CARDIOLOGY | Facility: MEDICAL CENTER | Age: 68
End: 2023-12-20
Attending: INTERNAL MEDICINE
Payer: MEDICARE

## 2024-05-06 ENCOUNTER — HOSPITAL ENCOUNTER (EMERGENCY)
Facility: MEDICAL CENTER | Age: 69
End: 2024-05-06
Attending: EMERGENCY MEDICINE
Payer: MEDICARE

## 2024-05-06 VITALS
DIASTOLIC BLOOD PRESSURE: 79 MMHG | HEIGHT: 67 IN | SYSTOLIC BLOOD PRESSURE: 137 MMHG | WEIGHT: 194.89 LBS | BODY MASS INDEX: 30.59 KG/M2 | TEMPERATURE: 97.6 F | HEART RATE: 93 BPM | OXYGEN SATURATION: 93 % | RESPIRATION RATE: 16 BRPM

## 2024-05-06 DIAGNOSIS — S05.01XA ABRASION OF RIGHT CORNEA, INITIAL ENCOUNTER: ICD-10-CM

## 2024-05-06 RX ORDER — PROPARACAINE HYDROCHLORIDE 5 MG/ML
1 SOLUTION/ DROPS OPHTHALMIC ONCE
Status: COMPLETED | OUTPATIENT
Start: 2024-05-06 | End: 2024-05-06

## 2024-05-06 RX ORDER — ERYTHROMYCIN 5 MG/G
1 OINTMENT OPHTHALMIC
Qty: 3.5 G | Refills: 0 | Status: ACTIVE | OUTPATIENT
Start: 2024-05-06 | End: 2024-05-06

## 2024-05-06 RX ORDER — ERYTHROMYCIN 5 MG/G
1 OINTMENT OPHTHALMIC
Qty: 3.5 G | Refills: 0 | Status: ACTIVE | OUTPATIENT
Start: 2024-05-06 | End: 2024-05-11

## 2024-05-06 RX ADMIN — FLUORESCEIN SODIUM 1 MG: 1 STRIP OPHTHALMIC at 14:23

## 2024-05-06 RX ADMIN — PROPARACAINE HYDROCHLORIDE 1 DROP: 5 SOLUTION/ DROPS OPHTHALMIC at 14:23

## 2024-05-06 ASSESSMENT — PAIN DESCRIPTION - PAIN TYPE: TYPE: ACUTE PAIN

## 2024-05-06 ASSESSMENT — FIBROSIS 4 INDEX: FIB4 SCORE: 1.51

## 2024-05-06 NOTE — ED NOTES
DC home with written and verbal education on eye injury. Pt will continue prescribed eye ointment and follow up with opthalmology as directed. She is ambulatory on DC.

## 2024-05-06 NOTE — ED TRIAGE NOTES
".  Chief Complaint   Patient presents with    Foreign Body in Eye     Right eye pain, redness and swelling, state she was cutting wood and feels like she got something in her eye she states she put contact solution that was   12 years ago, patient says she was seen at [facility] free standing and dx with corneal abrasion and given antibiotics, pt. States her eye is more swollen today     ./71   Pulse (!) 109   Temp 35.9 °C (96.7 °F) (Temporal)   Resp 16   Ht 1.702 m (5' 7\")   Wt 88.4 kg (194 lb 14.2 oz)   SpO2 92%   BMI 30.52 kg/m²     "

## 2024-05-06 NOTE — ED PROVIDER NOTES
ED Provider Note    CHIEF COMPLAINT  Chief Complaint   Patient presents with    Foreign Body in Eye     Right eye pain, redness and swelling, state she was cutting wood and feels like she got something in her eye she states she put contact solution that was   12 years ago, patient says she was seen at [facility] free standing and dx with corneal abrasion and given antibiotics, pt. States her eye is more swollen today       EXTERNAL RECORDS REVIEWED  Outpatient Notes cardiology notes reviewed.  Patient with history of paroxysmal A-fib status post cardioversion 1 year prior.    HPI/ROS  LIMITATION TO HISTORY   Select: : None  OUTSIDE HISTORIAN(S):      Ela Meza is a 69 y.o. female who presents to the emergency department foreign body sensation right eye.  Patient states that 2 days prior she was chopping wood and felt as though she got something in her eye.  She went to an outpatient urgent care yesterday where she was diagnosed with a corneal abrasion she was placed on erythromycin ointment.  She reports today that she has worsening pain in the eye and she is also had some swelling of her upper and lower eyelid.  Vision is blurred in the right eyes she has no headache she has no jaw claudication she has no pain with extraocular movements no other acute trauma no other acute symptom changes or concerns at this time.    PAST MEDICAL HISTORY   has a past medical history of Arrhythmia (2023), Arthritis (2023), Breath shortness (2023), and Hypertension (2023).    SURGICAL HISTORY  patient denies any surgical history    FAMILY HISTORY  No family history on file.    SOCIAL HISTORY  Social History     Tobacco Use    Smoking status: Never    Smokeless tobacco: Never   Vaping Use    Vaping Use: Never used   Substance and Sexual Activity    Alcohol use: Yes     Alcohol/week: 4.2 oz     Types: 7 Standard drinks or equivalent per week    Drug use: Never    Sexual activity: Not on file  "      CURRENT MEDICATIONS  Home Medications       Reviewed by Sandra Lenz R.N. (Registered Nurse) on 05/06/24 at 1342  Med List Status: Partial     Medication Last Dose Status   acetaminophen (TYLENOL) 500 MG Tab  Active   allopurinol (ZYLOPRIM) 100 MG Tab  Active   bisoprolol (ZEBETA) 10 MG tablet  Active   bisoprolol (ZEBETA) 5 MG Tab  Active   colchicine (COLCRYS) 0.6 MG Tab  Active   ELIQUIS 5 MG Tab  Active   lisinopril-hydrochlorothiazide (PRINZIDE) 20-12.5 MG per tablet  Active   pramipexole (MIRAPEX) 0.125 MG Tab  Active   Prenatal Vit-Fe Fumarate-FA (PRENATAL VITAMIN PO)  Active                    ALLERGIES  Allergies   Allergen Reactions    Nsaids Unspecified     S/P Bayron n Y    Compazine Swelling     tongue       PHYSICAL EXAM  VITAL SIGNS: /71   Pulse (!) 109   Temp 35.9 °C (96.7 °F) (Temporal)   Resp 16   Ht 1.702 m (5' 7\")   Wt 88.4 kg (194 lb 14.2 oz)   SpO2 92%   BMI 30.52 kg/m²    Constitutional: Alert and oriented x 3, minimal.  HENT: Normocephalic, Atraumatic, Bilateral external ears normal. Nose normal.   Eyes: Pupils are equal and reactive. Conjunctiva normal, non-icteric.       Intraocular pressure of the right eye is 10.3 mmHg    Heart: Regular rate and rythm,   Lungs: No respiratory distress  GI: Soft nontender nondistended   Skin: Warm, Dry, No erythema, No rash.   Neurologic: Cranial nerves III through XII grossly intact no sensory deficit no cerebellar dysfunction.   Psychiatric: Appropriate affect for situation      EKG/LABS      RADIOLOGY/PROCEDURES   I have independently interpreted the diagnostic imaging associated with this visit and am waiting the final reading from the radiologist.     My preliminary interpretation is as follows:       Radiologist interpretation:  No orders to display       COURSE & MEDICAL DECISION MAKING    ASSESSMENT, COURSE AND PLAN  Care Narrative: Patient with corneal abrasion as pictured above.  There is no evidence of retained foreign " "body including flipped eyelid exam.  Normal intraocular pressure.  Visual acuity shows some slightly decreased vision in the right eye.  Patient reports feeling immediately better after proparacaine drops.  Patient is given instructions to continue erythromycin ointment I given her another prescription for this to assure appropriate dosing.        ADDITIONAL PROBLEMS MANAGED    DISPOSITION AND DISCUSSIONS  I have discussed management of the patient with the following physicians and DEMETRIO's:      Discussion of management with other [facility] or appropriate source(s):      Escalation of care considered, and ultimately not performed:    Barriers to care at this time, including but not limited to: .     Decision tools and prescription drugs considered including, but not limited to: Antibiotics op ointment .  /71   Pulse (!) 109   Temp 35.9 °C (96.7 °F) (Temporal)   Resp 16   Ht 1.702 m (5' 7\")   Wt 88.4 kg (194 lb 14.2 oz)   SpO2 92%   BMI 30.52 kg/m²       [facility], Emergency Dept  [address] [facility] [address] [facility] [address] [phone]    in 12-24 hours if symptoms persist, immediately If symptoms worsen, or if you develop any other symptoms or concerns    Patricia Nieves M.D.  [address] [place] [address] [phone]    In 3 days  if symptoms persist      FINAL DIAGNOSIS  1. Abrasion of right cornea, initial encounter           Electronically signed by: Juan Faith M.D.      "

## 2024-12-27 ENCOUNTER — TELEPHONE (OUTPATIENT)
Dept: HEALTH INFORMATION MANAGEMENT | Facility: OTHER | Age: 69
End: 2024-12-27
Payer: MEDICARE

## 2025-07-22 ENCOUNTER — TELEPHONE (OUTPATIENT)
Dept: HEALTH INFORMATION MANAGEMENT | Facility: OTHER | Age: 70
End: 2025-07-22
Payer: MEDICARE